# Patient Record
Sex: FEMALE | Race: WHITE | NOT HISPANIC OR LATINO | Employment: STUDENT | URBAN - METROPOLITAN AREA
[De-identification: names, ages, dates, MRNs, and addresses within clinical notes are randomized per-mention and may not be internally consistent; named-entity substitution may affect disease eponyms.]

---
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
## 2024-07-30 ENCOUNTER — EVALUATION (OUTPATIENT)
Dept: OCCUPATIONAL THERAPY | Facility: CLINIC | Age: 29
End: 2024-07-30
Payer: COMMERCIAL

## 2024-07-30 DIAGNOSIS — R29.898 DECREASED GRIP STRENGTH OF RIGHT HAND: ICD-10-CM

## 2024-07-30 DIAGNOSIS — M25.631 DECREASED RANGE OF MOTION OF RIGHT WRIST: Primary | ICD-10-CM

## 2024-07-30 PROCEDURE — 97022 WHIRLPOOL THERAPY: CPT | Mod: GO | Performed by: OCCUPATIONAL THERAPIST

## 2024-07-30 PROCEDURE — 97165 OT EVAL LOW COMPLEX 30 MIN: CPT | Mod: GO | Performed by: OCCUPATIONAL THERAPIST

## 2024-07-30 PROCEDURE — 97110 THERAPEUTIC EXERCISES: CPT | Mod: GO | Performed by: OCCUPATIONAL THERAPIST

## 2024-07-30 ASSESSMENT — PATIENT HEALTH QUESTIONNAIRE - PHQ9
SUM OF ALL RESPONSES TO PHQ9 QUESTIONS 1 AND 2: 0
1. LITTLE INTEREST OR PLEASURE IN DOING THINGS: NOT AT ALL
2. FEELING DOWN, DEPRESSED OR HOPELESS: NOT AT ALL

## 2024-07-30 ASSESSMENT — ENCOUNTER SYMPTOMS
DEPRESSION: 0
LOSS OF SENSATION IN FEET: 0
OCCASIONAL FEELINGS OF UNSTEADINESS: 0

## 2024-07-30 ASSESSMENT — PAIN SCALES - GENERAL: PAINLEVEL_OUTOF10: 0 - NO PAIN

## 2024-07-30 ASSESSMENT — PAIN - FUNCTIONAL ASSESSMENT: PAIN_FUNCTIONAL_ASSESSMENT: 0-10

## 2024-08-01 ENCOUNTER — TREATMENT (OUTPATIENT)
Dept: OCCUPATIONAL THERAPY | Facility: CLINIC | Age: 29
End: 2024-08-01
Payer: COMMERCIAL

## 2024-08-01 DIAGNOSIS — M25.631 DECREASED RANGE OF MOTION OF RIGHT WRIST: ICD-10-CM

## 2024-08-01 DIAGNOSIS — R29.898 DECREASED GRIP STRENGTH OF RIGHT HAND: Primary | ICD-10-CM

## 2024-08-01 PROCEDURE — 97140 MANUAL THERAPY 1/> REGIONS: CPT | Mod: GO | Performed by: OCCUPATIONAL THERAPIST

## 2024-08-01 PROCEDURE — 97110 THERAPEUTIC EXERCISES: CPT | Mod: GO | Performed by: OCCUPATIONAL THERAPIST

## 2024-08-01 PROCEDURE — 97022 WHIRLPOOL THERAPY: CPT | Mod: GO | Performed by: OCCUPATIONAL THERAPIST

## 2024-08-01 NOTE — PROGRESS NOTES
OCCUPATIONAL THERAPY TREATMENT NOTE    Patient Name:  Colin Kay   Patient MRN: [id number]  Date: 8/1/2024  Time Calculation  Start Time: 0700  Stop Time: 0745  Time Calculation (min): 45 min    Insurance:  Visit number: 2   Insurance Type: Payor: [employer] / Plan: [employer] / Product Type: *No Product type* /   Authorization or Plan of Care date Range: 30 PT/OT/ST   Copay: NA  Referred by: No ref. provider found        OT Therapeutic Procedures Time Entry  Manual Therapy Time Entry: 20  Therapeutic Exercise Time Entry: 10  OT Modalities Time Entry  Whirlpool Time Entry: 15                  General:  Reason for visit: Right wrist fracture  Referred by: Wesley Saba PA-C    Type of surgery: No surgery found  Date of surgery: No surgery found  Days since surgery: No surgery found    Assessment:  Progress towards functional goals: Improved mobility in left wrist  Response to interventions:  Patient is limited in passive wrist extension and passive supination with OT stretches and joint mobilizations.  OT added passive table stretches to HEP to increase joint mobility and increase tendon length.  Colin demonstrated good technique.  Justification for continued skilled care: To address remaining functional, objective and subjective deficits to allow them to return to full independence with ADLs. Assess effectiveness of HEP. Modify and progress home exercise program. Skilled intervention required to improve ROM which will improve function. Progressive strengthening to stabilize the right wrist to improve function. Reduce pain to improve function.    Plan:  Progress exercises as tolerated to improve overall UE strength and performance in daily activities , Therapeutic exercises to strengthen left wrist for functional use in daily activities. , Activities to improve fine motor coordination and manual dexterity skills to improve performance in ADLs  "and IADLs, Continued education of techniques such as heat to decrease joint stiffness and muscle tightness., Exercises to gradually increase range of motion., Manual therapy to  muscle tightness and improve joint mobility. , and Modalities as needed to address symptoms.    Therapy diagnoses:   1. Decreased  strength of right hand        2. Decreased range of motion of right wrist  Follow Up In Occupational Therapy           Subjective:  Patient reports she has been trying to keep her brace off at home and when studying.  The wrist extension prayer stretches are difficult and increase pain.     Progress towards functional goal:  Improved mobility in right wrist  Pain Location right wrist  Pain (0-10): 2   HEP adherence / understanding: compliance with the instructed home exercises.    Precautions:  Fall Risk: None    Precautions listed: none    Objective:  Measurements 24    Treatment Performed: (\"NP\" = Not Performed)     Therapeutic Exercise: 10 min  - wrist passive table stretches x4  Therapeutic Activities: NP  -   Manual Therapy: 20 min  - joint traction and stretches to digits, wrist and forearm  Neuromuscular Re-Education: NP  -   Modalities: 15 min  - fluidotherapy with AROM 15 min    Education: Added table stretch wrist exercise(s) to HEP program  "

## 2024-08-05 ENCOUNTER — APPOINTMENT (OUTPATIENT)
Dept: OCCUPATIONAL THERAPY | Facility: CLINIC | Age: 29
End: 2024-08-05
Payer: COMMERCIAL

## 2024-08-06 ENCOUNTER — TREATMENT (OUTPATIENT)
Dept: OCCUPATIONAL THERAPY | Facility: CLINIC | Age: 29
End: 2024-08-06
Payer: COMMERCIAL

## 2024-08-06 DIAGNOSIS — R29.898 DECREASED GRIP STRENGTH OF RIGHT HAND: Primary | ICD-10-CM

## 2024-08-06 DIAGNOSIS — M25.631 DECREASED RANGE OF MOTION OF RIGHT WRIST: ICD-10-CM

## 2024-08-06 PROCEDURE — 97110 THERAPEUTIC EXERCISES: CPT | Mod: GO | Performed by: OCCUPATIONAL THERAPIST

## 2024-08-06 PROCEDURE — 97140 MANUAL THERAPY 1/> REGIONS: CPT | Mod: GO | Performed by: OCCUPATIONAL THERAPIST
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
## 2024-08-08 ENCOUNTER — TREATMENT (OUTPATIENT)
Dept: OCCUPATIONAL THERAPY | Facility: CLINIC | Age: 29
End: 2024-08-08
Payer: COMMERCIAL

## 2024-08-08 DIAGNOSIS — M25.631 DECREASED RANGE OF MOTION OF RIGHT WRIST: ICD-10-CM

## 2024-08-08 DIAGNOSIS — R29.898 DECREASED GRIP STRENGTH OF RIGHT HAND: Primary | ICD-10-CM

## 2024-08-08 PROCEDURE — 97110 THERAPEUTIC EXERCISES: CPT | Mod: GO

## 2024-08-08 PROCEDURE — 97022 WHIRLPOOL THERAPY: CPT | Mod: GO

## 2024-08-08 PROCEDURE — 97140 MANUAL THERAPY 1/> REGIONS: CPT | Mod: GO

## 2024-08-08 ASSESSMENT — PAIN - FUNCTIONAL ASSESSMENT: PAIN_FUNCTIONAL_ASSESSMENT: 0-10

## 2024-08-08 ASSESSMENT — PAIN SCALES - GENERAL: PAINLEVEL_OUTOF10: 0 - NO PAIN

## 2024-08-08 NOTE — PROGRESS NOTES
OCCUPATIONAL THERAPY TREATMENT NOTE    Patient Name:  Colin Kay   Patient MRN: [id number]  Date: 8/8/2024  Time Calculation  Start Time: 1415  Stop Time: 1500  Time Calculation (min): 45 min    Insurance:  Visit number: 4  Insurance Type: Payor: [employer] / Plan: [employer] / Product Type: *No Product type* /   Authorization or Plan of Care date Range: 30 PT/OT/ST   Copay: NA  Referred by: Wesley Saba PA-C       OT Therapeutic Procedures Time Entry  Manual Therapy Time Entry: 20  Therapeutic Exercise Time Entry: 10  OT Modalities Time Entry  Whirlpool Time Entry: 15                  General:  Reason for visit: Right wrist fracture  Referred by: Wesley Saba PA-C    Assessment:  Progress towards functional goals: Improved mobility in left wrist  Response to interventions:  Severe stiffness noted in wrist flexion and supination during manual therapy. Pain noted during wrist flexion. Performed wrist flexion weight hangs and hammer sup/pro to assist in improving ROM. Patient notes mild pain during hammer sup/pro. Advised patient to rest tonight and apply heat if pain/soreness increases. .  Justification for continued skilled care: To address remaining functional, objective and subjective deficits to allow them to return to full independence with ADLs. Assess effectiveness of HEP. Modify and progress home exercise program. Skilled intervention required to improve ROM which will improve function. Progressive strengthening to stabilize the right wrist to improve function. Reduce pain to improve function.    Plan:  Progress exercises as tolerated to improve overall UE strength and performance in daily activities , Therapeutic exercises to strengthen left wrist for functional use in daily activities. , Activities to improve fine motor coordination and manual dexterity skills to improve performance in ADLs and IADLs, Continued education of  "techniques such as heat to decrease joint stiffness and muscle tightness., Exercises to gradually increase range of motion., Manual therapy to  muscle tightness and improve joint mobility. , and Modalities as needed to address symptoms.    Therapy diagnoses:   1. Decreased  strength of right hand        2. Decreased range of motion of right wrist  Follow Up In Occupational Therapy             Subjective:  Patient reports she continues to have stiffness in the R wrist. Notes she has stopped wearing the wrist wrap provided. Has been completing her HEP exercises but notes a moderate pain when completing them.      Progress towards functional goal:  Improved mobility in right wrist  Pain Location right wrist  Pain (0-10): 0; described as stiffness  HEP adherence / understanding: compliance with the instructed home exercises.    Precautions:  Fall Risk: None    Precautions listed: none    Objective:  Measurements 24    Treatment Performed: (\"NP\" = Not Performed)     Therapeutic Exercise: 10 min  - Wrist flexion weight hangs with 1#   - Hammer supination and pronation   Therapeutic Activities: NP  -   Manual Therapy: 20 min  - joint traction and stretches to digits, wrist and forearm  Neuromuscular Re-Education: NP  -   Modalities: 15 min  - fluidotherapy with AROM 15 min    Education: Reviewed home exercise program.  "

## 2024-08-09 ENCOUNTER — LAB REQUISITION (OUTPATIENT)
Dept: LAB | Facility: HOSPITAL | Age: 29
End: 2024-08-09
Payer: COMMERCIAL

## 2024-08-09 ENCOUNTER — APPOINTMENT (OUTPATIENT)
Dept: OCCUPATIONAL THERAPY | Facility: CLINIC | Age: 29
End: 2024-08-09
Payer: COMMERCIAL

## 2024-08-09 PROCEDURE — 87086 URINE CULTURE/COLONY COUNT: CPT

## 2024-08-09 NOTE — PROGRESS NOTES
z                                                             OCCUPATIONAL THERAPY TREATMENT NOTE    Patient Name:  Colin Kay   Patient MRN: [id number]  Date: 8/12/2024  Time Calculation  Start Time: 0215  Stop Time: 0300  Time Calculation (min): 45 min    Insurance:  Visit number: 5  Insurance Type: Payor: [employer] / Plan: [employer] HE[employer] PLAN / Product Type: *No Product type* /   Authorization or Plan of Care date Range: 30 PT/OT/ST   Copay: NA  Referred by: Wesley Saba PA-C       OT Therapeutic Procedures Time Entry  Manual Therapy Time Entry: 24  Therapeutic Activity Time Entry: 6  OT Modalities Time Entry  Whirlpool Time Entry: 15                  General:  Reason for visit: Right wrist fracture  Referred by: Wesley Saba PA-C    Assessment:  Progress towards functional goals: Improved mobility in left wrist  Response to interventions:  OT initiated BTE program for no resistance stretching of wrist and forearm this visit.  Patient was challenged with decreased range, strength and endurance demonstrated.  Continued focus on supination for HEP.  Justification for continued skilled care: To address remaining functional, objective and subjective deficits to allow them to return to full independence with ADLs. Assess effectiveness of HEP. Modify and progress home exercise program. Skilled intervention required to improve ROM which will improve function. Progressive strengthening to stabilize the right wrist to improve function. Reduce pain to improve function.    Plan:  Progress exercises as tolerated to improve overall UE strength and performance in daily activities , Therapeutic exercises to strengthen left wrist for functional use in daily activities. , Activities to improve fine motor coordination and manual dexterity skills to improve performance in ADLs and IADLs, Continued education of techniques such as heat to decrease joint stiffness and muscle tightness., Exercises to gradually increase range of  "motion., Manual therapy to  muscle tightness and improve joint mobility. , and Modalities as needed to address symptoms.    Therapy diagnoses:   1. Decreased  strength of right hand        2. Decreased range of motion of right wrist  Follow Up In Occupational Therapy               Subjective:  Patient reports the only time she is wearing the wrist brace is to sleep.  She notes slow improvements in range of motion for daily living tasks.      Progress towards functional goal:  Improved mobility in right wrist  Pain Location right wrist  Pain (0-10): 0; described as stiffness  HEP adherence / understanding: compliance with the instructed home exercises.    Precautions:  Fall Risk: None    Precautions listed: none    Objective:  Measurements 24    Treatment Performed: (\"NP\" = Not Performed)     Therapeutic Exercise: NP    Therapeutic Activities: 4 min  -  1 min  -  2 min  -  2 min  Manual Therapy: 26 min  - joint traction and stretches to digits, wrist and forearm  Neuromuscular Re-Education: NP  -   Modalities: 15 min  - fluidotherapy with AROM 15 min    Education: Reviewed home exercise program.  "

## 2024-08-11 LAB — BACTERIA UR CULT: NORMAL

## 2024-08-12 ENCOUNTER — TREATMENT (OUTPATIENT)
Dept: OCCUPATIONAL THERAPY | Facility: CLINIC | Age: 29
End: 2024-08-12
Payer: COMMERCIAL

## 2024-08-12 DIAGNOSIS — R29.898 DECREASED GRIP STRENGTH OF RIGHT HAND: Primary | ICD-10-CM

## 2024-08-12 DIAGNOSIS — M25.631 DECREASED RANGE OF MOTION OF RIGHT WRIST: ICD-10-CM

## 2024-08-12 PROCEDURE — 97022 WHIRLPOOL THERAPY: CPT | Mod: GO | Performed by: OCCUPATIONAL THERAPIST

## 2024-08-12 PROCEDURE — 97140 MANUAL THERAPY 1/> REGIONS: CPT | Mod: GO | Performed by: OCCUPATIONAL THERAPIST

## 2024-08-13 NOTE — PROGRESS NOTES
OCCUPATIONAL THERAPY TREATMENT NOTE    Patient Name:  Colin Kay   Patient MRN: [id number]  Date: 8/14/2024  Time Calculation  Start Time: 0215  Stop Time: 0300  Time Calculation (min): 45 min    Insurance:  Visit number: 6  Insurance Type: Payor: [employer] / Plan: [employer] / Product Type: *No Product type* /   Authorization or Plan of Care date Range: 30 PT/OT/ST   Copay: NA  Referred by: Wesley Saba PA-C       OT Therapeutic Procedures Time Entry  Manual Therapy Time Entry: 20  Therapeutic Exercise Time Entry: 10  OT Modalities Time Entry  Whirlpool Time Entry: 15                  General:  Reason for visit: Right wrist fracture  Referred by: Wesley Saba PA-C    Assessment:  Progress towards functional goals: Improved mobility in left wrist  Response to interventions:  Increased active range of motion in all planes since last measurement.  Patient is concerned with lack of strength in hand and with slow progress in range of motion.  OT updated HEP to include putty strengthening program for  and pinch.  Patient demonstrated good technique with each exercise.  OT educated patient on benefits of [employer]ne dynamic brace for both supination and wrist extension.  OT will contact sales rep for insurance verification.  Justification for continued skilled care: To address remaining functional, objective and subjective deficits to allow them to return to full independence with ADLs. Assess effectiveness of HEP. Modify and progress home exercise program. Skilled intervention required to improve ROM which will improve function. Progressive strengthening to stabilize the right wrist to improve function. Reduce pain to improve function.    Plan:  Progress exercises as tolerated to improve overall UE strength and performance in daily activities , Therapeutic exercises to strengthen left wrist for functional use in daily activities. , Activities to improve fine motor coordination  "and manual dexterity skills to improve performance in ADLs and IADLs, Continued education of techniques such as heat to decrease joint stiffness and muscle tightness., Exercises to gradually increase range of motion., Manual therapy to  muscle tightness and improve joint mobility. , and Modalities as needed to address symptoms.  Request insurance verification from [employer]    Therapy diagnoses:   1. Decreased  strength of right hand        2. Decreased range of motion of right wrist  Follow Up In Occupational Therapy            Subjective:  Patient reports she was very sore after last visit and wore her brace to sleep.  Pain has resolved today.      Progress towards functional goal:  Improved mobility in right wrist  Pain Location right wrist  Pain (0-10): 0; described as stiffness  HEP adherence / understanding: compliance with the instructed home exercises.    Precautions:  Fall Risk: None    Precautions listed: none    Objective:  Measurements 24  right Elbow:  - Flexion: WNL  - Extension: WNL  - Supination: 44  - Pronation: 75     Right Wrist:  - Flexion: 35  - Extension: 52  - Rd dev: 15  - Ul dev: 20  Treatment Performed: (\"NP\" = Not Performed)     Therapeutic Exercise: 10 min  Blue putty HEP:  , roll and tip pinch, lateral pinch, 3pt pinch  Written and illustrated handouts on benefits of stat a dyne brace   Therapeutic Activities: NP    Manual Therapy: 20 min  - joint traction and stretches to digits, wrist and forearm  Neuromuscular Re-Education: NP  -   Modalities: 15 min  - fluidotherapy with AROM 15 min    Education: Added putty exercise(s) to HEP program and educated on stat a dyne dynamic splint  "

## 2024-08-14 ENCOUNTER — TREATMENT (OUTPATIENT)
Dept: OCCUPATIONAL THERAPY | Facility: CLINIC | Age: 29
End: 2024-08-14
Payer: COMMERCIAL

## 2024-08-14 DIAGNOSIS — M25.631 DECREASED RANGE OF MOTION OF RIGHT WRIST: ICD-10-CM

## 2024-08-14 DIAGNOSIS — R29.898 DECREASED GRIP STRENGTH OF RIGHT HAND: Primary | ICD-10-CM

## 2024-08-14 PROCEDURE — 97110 THERAPEUTIC EXERCISES: CPT | Mod: GO | Performed by: OCCUPATIONAL THERAPIST

## 2024-08-14 PROCEDURE — 97022 WHIRLPOOL THERAPY: CPT | Mod: GO | Performed by: OCCUPATIONAL THERAPIST

## 2024-08-14 PROCEDURE — 97140 MANUAL THERAPY 1/> REGIONS: CPT | Mod: GO | Performed by: OCCUPATIONAL THERAPIST

## 2024-08-19 NOTE — PROGRESS NOTES
OCCUPATIONAL THERAPY TREATMENT NOTE    Patient Name:  Colin Kay   Patient MRN: [id number]  Date: 2024  Time Calculation  Start Time: 08  Stop Time: 920  Time Calculation (min): 45 min    Insurance:  Visit number: 7  Insurance Type: Payor: [employer] / Plan: [employer] / Product Type: *No Product type* /   Authorization or Plan of Care date Range: 30 PT/OT/ST   Copay: NA  Referred by: Wesley Saba PA-C       OT Therapeutic Procedures Time Entry  Manual Therapy Time Entry: 30  OT Modalities Time Entry  Whirlpool Time Entry: 15                  General:  Reason for visit: Right wrist fracture  Referred by: Wesley Saba PA-C    Assessment:  Progress towards functional goals: Improved mobility in left wrist  Response to interventions:  Increased tolerance to OT stretches in wrist flexion and extension this visit.  Improving wrist extension with stretch in the wrist flexors.  Continued limitation in active and passive supination with DRUJ tightness.  Justification for continued skilled care: To address remaining functional, objective and subjective deficits to allow them to return to full independence with ADLs. Assess effectiveness of HEP. Modify and progress home exercise program. Skilled intervention required to improve ROM which will improve function. Progressive strengthening to stabilize the right wrist to improve function. Reduce pain to improve function.    Plan:  Progress exercises as tolerated to improve overall UE strength and performance in daily activities , Therapeutic exercises to strengthen left wrist for functional use in daily activities. , Activities to improve fine motor coordination and manual dexterity skills to improve performance in ADLs and IADLs, Continued education of techniques such as heat to decrease joint stiffness and muscle tightness., Exercises to gradually increase range of motion., Manual therapy to  muscle tightness and improve  "joint mobility. , and Modalities as needed to address symptoms.  Request insurance verification from [employer] a [employer]  Awaiting signed script from physician for [employer] approval    Therapy diagnoses:   1. Decreased  strength of right hand        2. Decreased range of motion of right wrist  Follow Up In Occupational Therapy              Subjective:  Patient reports she is experiencing more \"cracking\" in her wrist over the past several days.      Progress towards functional goal:  Improved mobility in right wrist  Pain Location right wrist  Pain (0-10): 0; described as stiffness  HEP adherence / understanding: compliance with the instructed home exercises.    Precautions:  Fall Risk: None    Precautions listed: none    Objective:  Measurements 7/30/24, 8/14/24    Treatment Performed: (\"NP\" = Not Performed)     Therapeutic Exercise: NP    Therapeutic Activities: NP    Manual Therapy: 30 min  - joint traction and stretches to digits, wrist and forearm  Neuromuscular Re-Education: NP  -   Modalities: 15 min  - fluidotherapy with AROM 15 min    Education: Reviewed home exercise program.   "

## 2024-08-20 ENCOUNTER — TREATMENT (OUTPATIENT)
Dept: OCCUPATIONAL THERAPY | Facility: CLINIC | Age: 29
End: 2024-08-20
Payer: COMMERCIAL

## 2024-08-20 DIAGNOSIS — M25.631 DECREASED RANGE OF MOTION OF RIGHT WRIST: ICD-10-CM

## 2024-08-20 DIAGNOSIS — R29.898 DECREASED GRIP STRENGTH OF RIGHT HAND: Primary | ICD-10-CM

## 2024-08-20 PROCEDURE — 97022 WHIRLPOOL THERAPY: CPT | Mod: GO | Performed by: OCCUPATIONAL THERAPIST

## 2024-08-20 PROCEDURE — 97140 MANUAL THERAPY 1/> REGIONS: CPT | Mod: GO | Performed by: OCCUPATIONAL THERAPIST

## 2024-08-21 NOTE — PROGRESS NOTES
OCCUPATIONAL THERAPY TREATMENT NOTE    Patient Name:  Colin Kay   Patient MRN: [id number]  Date: 8/22/2024  Time Calculation  Start Time: 0700  Stop Time: 0745  Time Calculation (min): 45 min    Insurance:  Visit number: 8  Insurance Type: Payor: [employer] / Plan: [employer] / Product Type: *No Product type* /   Authorization or Plan of Care date Range: 30 PT/OT/ST   Copay: NA  Referred by: Wesley Saba PA-C       OT Therapeutic Procedures Time Entry  Manual Therapy Time Entry: 30  OT Modalities Time Entry  Whirlpool Time Entry: 15                  General:  Reason for visit: Right wrist fracture  Referred by: Wesley Saba PA-C    Assessment:  Progress towards functional goals: Improved mobility in left wrist  Response to interventions:  Increased DRUJ mobility with OT manual techniques this visit. Improving tolerance to passive stretches with decreased pain levels.  Patient would benefit from stat a dyne dynamic splint to increase wrist and forearm AROM and to improve joint mobility to improve functional use of the right arm for ADL and work/school tasks.  Justification for continued skilled care: To address remaining functional, objective and subjective deficits to allow them to return to full independence with ADLs. Assess effectiveness of HEP. Modify and progress home exercise program. Skilled intervention required to improve ROM which will improve function. Progressive strengthening to stabilize the right wrist to improve function. Reduce pain to improve function.    Plan:  Progress exercises as tolerated to improve overall UE strength and performance in daily activities , Therapeutic exercises to strengthen left wrist for functional use in daily activities. , Activities to improve fine motor coordination and manual dexterity skills to improve performance in ADLs and IADLs, Continued education of techniques such as heat to decrease joint stiffness and muscle tightness.,  "Exercises to gradually increase range of motion., Manual therapy to  muscle tightness and improve joint mobility. , and Modalities as needed to address symptoms.  Faxed signed script and measurements to stat a dyne rep for ordering    Therapy diagnoses:   1. Decreased  strength of right hand  Follow Up In Occupational Therapy      2. Decreased range of motion of right wrist  Follow Up In Occupational Therapy                Subjective:  Signed script received and OT will proceed with stat a dyne order.  Patient reports \"cracking\" has increased and slight improvement in mobility.     Progress towards functional goal:  Improved mobility in right wrist  Pain Location right wrist  Pain (0-10): 0; described as stiffness  HEP adherence / understanding: compliance with the instructed home exercises.    Precautions:  Fall Risk: None    Precautions listed: none    Objective:  Measurements 24, 24    Treatment Performed: (\"NP\" = Not Performed)     Therapeutic Exercise: NP    Therapeutic Activities: NP    Manual Therapy: 30 min  - joint traction and stretches to digits, wrist and forearm  Neuromuscular Re-Education: NP  -   Modalities: 15 min  - fluidotherapy with AROM 15 min    Education: Reviewed home exercise program.   "

## 2024-08-22 ENCOUNTER — TREATMENT (OUTPATIENT)
Dept: OCCUPATIONAL THERAPY | Facility: CLINIC | Age: 29
End: 2024-08-22
Payer: COMMERCIAL

## 2024-08-22 DIAGNOSIS — M25.631 DECREASED RANGE OF MOTION OF RIGHT WRIST: ICD-10-CM

## 2024-08-22 DIAGNOSIS — R29.898 DECREASED GRIP STRENGTH OF RIGHT HAND: Primary | ICD-10-CM

## 2024-08-22 PROCEDURE — 97022 WHIRLPOOL THERAPY: CPT | Mod: GO | Performed by: OCCUPATIONAL THERAPIST

## 2024-08-22 PROCEDURE — 97140 MANUAL THERAPY 1/> REGIONS: CPT | Mod: GO | Performed by: OCCUPATIONAL THERAPIST

## 2024-08-26 NOTE — PROGRESS NOTES
OCCUPATIONAL THERAPY TREATMENT NOTE    Patient Name:  Colin Kay   Patient MRN: [id number]  Date: 8/27/2024

## 2024-08-27 ENCOUNTER — DOCUMENTATION (OUTPATIENT)
Dept: OCCUPATIONAL THERAPY | Facility: CLINIC | Age: 29
End: 2024-08-27
Payer: COMMERCIAL

## 2024-08-27 ENCOUNTER — APPOINTMENT (OUTPATIENT)
Dept: OCCUPATIONAL THERAPY | Facility: CLINIC | Age: 29
End: 2024-08-27
Payer: COMMERCIAL

## 2024-08-27 NOTE — PROGRESS NOTES
Occupational Therapy                 Therapy Communication Note    Patient Name: Colin Kay  MRN: [id number]  Today's Date: 8/27/2024     Discipline: Occupational Therapy    Missed Visit Reason: Patient cancelled today's appt, no reason     Missed Time: Cancel    Comment:

## 2024-08-29 ENCOUNTER — TREATMENT (OUTPATIENT)
Dept: OCCUPATIONAL THERAPY | Facility: CLINIC | Age: 29
End: 2024-08-29
Payer: COMMERCIAL

## 2024-08-29 DIAGNOSIS — R29.898 DECREASED GRIP STRENGTH OF RIGHT HAND: Primary | ICD-10-CM

## 2024-08-29 DIAGNOSIS — M25.631 DECREASED RANGE OF MOTION OF RIGHT WRIST: ICD-10-CM

## 2024-08-29 PROCEDURE — 97140 MANUAL THERAPY 1/> REGIONS: CPT | Mod: GO | Performed by: OCCUPATIONAL THERAPIST

## 2024-08-29 PROCEDURE — 97022 WHIRLPOOL THERAPY: CPT | Mod: GO | Performed by: OCCUPATIONAL THERAPIST

## 2024-08-29 PROCEDURE — 97110 THERAPEUTIC EXERCISES: CPT | Mod: GO | Performed by: OCCUPATIONAL THERAPIST

## 2024-08-29 NOTE — PROGRESS NOTES
OCCUPATIONAL THERAPY TREATMENT NOTE    Patient Name:  Colin Kay   Patient MRN: [id number]  Date: 2024  Time Calculation  Start Time: 0430  Stop Time: 0520  Time Calculation (min): 50 min    Insurance:  Visit number: 9  Insurance Type: Payor: [employer] / Plan: [employer] / Product Type: *No Product type* /   Authorization or Plan of Care date Range: 30 PT/OT/ST   Copay: NA  Referred by: Wesley Saba PA-C       OT Therapeutic Procedures Time Entry  Manual Therapy Time Entry: 30  Therapeutic Exercise Time Entry: 10  OT Modalities Time Entry  Whirlpool Time Entry: 15                  General:  Reason for visit: Right wrist fracture  Referred by: Wesley Saba PA-C    Assessment:  Progress towards functional goals: Improved mobility in left wrist  Response to interventions:  Increased AROM in all planes except supination.  OT added wrist extension wall pushups to HEP and patient demonstrated good technique.    Justification for continued skilled care: To address remaining functional, objective and subjective deficits to allow them to return to full independence with ADLs. Assess effectiveness of HEP. Modify and progress home exercise program. Skilled intervention required to improve ROM which will improve function. Progressive strengthening to stabilize the right wrist to improve function. Reduce pain to improve function.    Plan:  Progress exercises as tolerated to improve overall UE strength and performance in daily activities , Therapeutic exercises to strengthen left wrist for functional use in daily activities. , Activities to improve fine motor coordination and manual dexterity skills to improve performance in ADLs and IADLs, Continued education of techniques such as heat to decrease joint stiffness and muscle tightness., Exercises to gradually increase range of motion., Manual therapy to  muscle tightness and improve joint mobility. , and Modalities as needed to  "address symptoms.    Therapy diagnoses:   1. Decreased  strength of right hand        2. Decreased range of motion of right wrist  Follow Up In Occupational Therapy                  Subjective:  Patient reports it has been a busy week at school with exams and she has not been as diligent in completing HEP.     Progress towards functional goal:  Improved mobility in right wrist  Pain Location right wrist  Pain (0-10): 1; described as stiffness  HEP adherence / understanding: compliance with the instructed home exercises.    Precautions:  Fall Risk: None    Precautions listed: none    Objective:  Measurements 7/30/24, 8/14/24, 8/29/24  right Elbow:  - Flexion: WNL  - Extension: WNL  - Supination: 44  - Pronation: 85     Right Wrist:  - Flexion: 45  - Extension: 66  - Rd dev: 15  - Ul dev: 25  Treatment Performed: (\"NP\" = Not Performed)     Therapeutic Exercise:10 min  -objective measures of AROM  - wall pushups for HEP to increase wrist extension  Therapeutic Activities: NP    Manual Therapy: 30 min  - joint traction and stretches to digits, wrist and forearm  Neuromuscular Re-Education: NP  -   Modalities: 15 min  - fluidotherapy with AROM 15 min    Education: Reviewed home exercise program.   "

## 2024-09-04 ENCOUNTER — TREATMENT (OUTPATIENT)
Dept: OCCUPATIONAL THERAPY | Facility: CLINIC | Age: 29
End: 2024-09-04
Payer: COMMERCIAL

## 2024-09-04 DIAGNOSIS — M25.631 DECREASED RANGE OF MOTION OF RIGHT WRIST: Primary | ICD-10-CM

## 2024-09-04 DIAGNOSIS — R29.898 DECREASED GRIP STRENGTH OF RIGHT HAND: ICD-10-CM

## 2024-09-04 PROCEDURE — 97022 WHIRLPOOL THERAPY: CPT | Mod: GO | Performed by: OCCUPATIONAL THERAPIST

## 2024-09-04 PROCEDURE — 97140 MANUAL THERAPY 1/> REGIONS: CPT | Mod: GO | Performed by: OCCUPATIONAL THERAPIST

## 2024-09-04 NOTE — PROGRESS NOTES
OCCUPATIONAL THERAPY TREATMENT NOTE    Patient Name:  Colin Kay   Patient MRN: [id number]  Date: 2024  Time Calculation  Start Time: 345  Stop Time: 0430  Time Calculation (min): 45 min    Insurance:  Visit number: 10  Insurance Type: Payor: [employer] / Plan: [employer] / Product Type: *No Product type* /   Authorization or Plan of Care date Range: 30 PT/OT/ST   Copay: NA  Referred by: Wesley Saba PA-C       OT Therapeutic Procedures Time Entry  Manual Therapy Time Entry: 25  Therapeutic Exercise Time Entry: 5  OT Modalities Time Entry  Whirlpool Time Entry: 15                  General:  Reason for visit: Right wrist fracture  Referred by: Wesley Saba PA-C    Assessment:  Progress towards functional goals: Improved mobility in left wrist  Response to interventions:  Increased active supination was demonstrated by 5 degrees since last visit. OT added dowel passive supination stretch to HEP and patient demonstrated good technique and good stretch was achieved.    Justification for continued skilled care: To address remaining functional, objective and subjective deficits to allow them to return to full independence with ADLs. Assess effectiveness of HEP. Modify and progress home exercise program. Skilled intervention required to improve ROM which will improve function. Progressive strengthening to stabilize the right wrist to improve function. Reduce pain to improve function.    Plan:  Progress exercises as tolerated to improve overall UE strength and performance in daily activities , Therapeutic exercises to strengthen left wrist for functional use in daily activities. , Activities to improve fine motor coordination and manual dexterity skills to improve performance in ADLs and IADLs, Continued education of techniques such as heat to decrease joint stiffness and muscle tightness., Exercises to gradually increase range of motion., Manual therapy to  muscle  "tightness and improve joint mobility. , and Modalities as needed to address symptoms.  OT checking on Dynasplint coverage for supination splint    Therapy diagnoses:   1. Decreased range of motion of right wrist  Follow Up In Occupational Therapy      2. 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  The stat a dyne brace is out of network and would be $180/month out of pocket .     Progress towards functional goal:  Improved mobility in right wrist  Pain Location right wrist  Pain (0-10): 1; described as stiffness  HEP adherence / understanding: compliance with the instructed home exercises.    Precautions:  Fall Risk: None    Precautions listed: none    Objective:  Measurements 7/30/24, 8/14/24, 8/29/24  Supination 50 deg    Treatment Performed: (\"NP\" = Not Performed)     Therapeutic Exercise:5 min  Dowel supination stretch  Therapeutic Activities: NP    Manual Therapy: 25 min  - joint traction and stretches to digits, wrist and forearm  Neuromuscular Re-Education: NP  -   Modalities: 15 min  - fluidotherapy with AROM 15 min    Education: Added dowel supination exercise(s) to HEP program   "

## 2024-09-05 ENCOUNTER — TREATMENT (OUTPATIENT)
Dept: OCCUPATIONAL THERAPY | Facility: CLINIC | Age: 29
End: 2024-09-05
Payer: COMMERCIAL

## 2024-09-05 DIAGNOSIS — M25.631 DECREASED RANGE OF MOTION OF RIGHT WRIST: ICD-10-CM

## 2024-09-05 DIAGNOSIS — R29.898 DECREASED GRIP STRENGTH OF RIGHT HAND: Primary | ICD-10-CM

## 2024-09-05 PROCEDURE — 97022 WHIRLPOOL THERAPY: CPT | Mod: GO | Performed by: OCCUPATIONAL THERAPIST

## 2024-09-05 PROCEDURE — 97140 MANUAL THERAPY 1/> REGIONS: CPT | Mod: GO | Performed by: OCCUPATIONAL THERAPIST

## 2024-09-05 NOTE — PROGRESS NOTES
OCCUPATIONAL THERAPY TREATMENT NOTE    Patient Name:  Colin Kay   Patient MRN: [id number]  Date: 2024  Time Calculation  Start Time: 1350  Stop Time: 1430  Time Calculation (min): 40 min    Insurance:  Visit number: 11  Insurance Type: Payor: [employer] / Plan: [employer] / Product Type: *No Product type* /   Authorization or Plan of Care date Range: 30 PT/OT/ST   Copay: NA  Referred by: Wesley Saba PA-C       OT Therapeutic Procedures Time Entry  Manual Therapy Time Entry: 25  OT Modalities Time Entry  Whirlpool Time Entry: 15                  General:  Reason for visit: Right wrist fracture  Referred by: Wesley Saba PA-C    Assessment:  Progress towards functional goals: Improved mobility in left wrist  Response to interventions:  Increased joint mobility and crepitus with aggressive OT manual techniques this visit.  She achieved and increase of 4 degrees in supination actively from yesterday's visit.     Justification for continued skilled care: To address remaining functional, objective and subjective deficits to allow them to return to full independence with ADLs. Assess effectiveness of HEP. Modify and progress home exercise program. Skilled intervention required to improve ROM which will improve function. Progressive strengthening to stabilize the right wrist to improve function. Reduce pain to improve function.    Plan:  Progress exercises as tolerated to improve overall UE strength and performance in daily activities , Therapeutic exercises to strengthen left wrist for functional use in daily activities. , Activities to improve fine motor coordination and manual dexterity skills to improve performance in ADLs and IADLs, Continued education of techniques such as heat to decrease joint stiffness and muscle tightness., Exercises to gradually increase range of motion., Manual therapy to  muscle tightness and improve joint mobility. , and Modalities as needed  "to address symptoms.  OT checking on Dynasplint coverage for supination splint    Therapy diagnoses:   1. Decreased  strength of right hand        2. Decreased range of motion of right wrist  Follow Up In Occupational Therapy            Subjective:  Patient reports she was unable to attempt supination stretches as she was just here yesterday and has been studying for exams.     Progress towards functional goal:  Improved mobility in right wrist  Pain Location right wrist  Pain (0-10): 1; described as stiffness  HEP adherence / understanding: compliance with the instructed home exercises.    Precautions:  Fall Risk: None    Precautions listed: none    Objective:  Measurements 7/30/24, 8/14/24, 8/29/24  Supination 54 deg  Flexion 45  Extension 65    Treatment Performed: (\"NP\" = Not Performed)     Therapeutic Exercise:NP    Therapeutic Activities: NP    Manual Therapy: 25 min  - joint traction and stretches to digits, wrist and forearm  Neuromuscular Re-Education: NP  -   Modalities: 15 min  - fluidotherapy with AROM 15 min    Education: Reviewed home exercise program.   "

## 2024-09-09 NOTE — PROGRESS NOTES
OCCUPATIONAL THERAPY TREATMENT NOTE    Patient Name:  Colin Kay   Patient MRN: [id number]  Date: 9/10/2024  Time Calculation  Start Time: 0745  Stop Time: 0830  Time Calculation (min): 45 min    Insurance:  Visit number: 12  Insurance Type: Payor: [employer]IDEN / Plan: [employer] / Product Type: *No Product type* /   Authorization or Plan of Care date Range: 30 PT/OT/ST   Copay: NA  Referred by: Wesley Saba PA-C       OT Therapeutic Procedures Time Entry  Manual Therapy Time Entry: 25  Therapeutic Exercise Time Entry: 2  OT Modalities Time Entry  Whirlpool Time Entry: 15                  General:  Reason for visit: Right wrist fracture  Referred by: Wesley Saba PA-C    Assessment:  Progress towards functional goals: Improved mobility in left wrist  Response to interventions:  Colin is making slow and steady progress in AROM of wrist and forearm in all planes.  Improved DRUJ mobility with OT manual techniques this visit.  OT instructed patient to return to wrist extension stretches along with continued focus on supination stretches.     Justification for continued skilled care: To address remaining functional, objective and subjective deficits to allow them to return to full independence with ADLs. Assess effectiveness of HEP. Modify and progress home exercise program. Skilled intervention required to improve ROM which will improve function. Progressive strengthening to stabilize the right wrist to improve function. Reduce pain to improve function.    Plan:  Progress exercises as tolerated to improve overall UE strength and performance in daily activities , Therapeutic exercises to strengthen left wrist for functional use in daily activities. , Activities to improve fine motor coordination and manual dexterity skills to improve performance in ADLs and IADLs, Continued education of techniques such as heat to decrease joint stiffness and muscle tightness., Exercises to  "gradually increase range of motion., Manual therapy to  muscle tightness and improve joint mobility. , and Modalities as needed to address symptoms.  OT checking on Dynasplint coverage for supination splint    Therapy diagnoses:   1. Decreased range of motion of right wrist        2. Decreased  strength of right hand  Follow Up In Occupational Therapy          Subjective:  Patient reports she has been hearing a lot of \"cracking\" in her wrist joint since last visit.     Progress towards functional goal:  Improved mobility in right wrist  Pain Location right wrist  Pain (0-10): 1; described as stiffness  HEP adherence / understanding: compliance with the instructed home exercises.    Precautions:  Fall Risk: None    Precautions listed: none    Objective:  Measurements 24, 24, 24, 9/10/24  Supination 55 deg  Pronation 85  Wrist flexion 55  Wrist ext 62    Treatment Performed: (\"NP\" = Not Performed)     Therapeutic Exercise: 2 min   1T 2 min stretching  Therapeutic Activities: NP    Manual Therapy: 25 min  - joint traction and stretches to digits, wrist and forearm  Neuromuscular Re-Education: NP  -   Modalities: 15 min  - fluidotherapy with AROM 15 min    Education: Reviewed home exercise program.   "

## 2024-09-10 ENCOUNTER — TREATMENT (OUTPATIENT)
Dept: OCCUPATIONAL THERAPY | Facility: CLINIC | Age: 29
End: 2024-09-10
Payer: COMMERCIAL

## 2024-09-10 DIAGNOSIS — M25.631 DECREASED RANGE OF MOTION OF RIGHT WRIST: Primary | ICD-10-CM

## 2024-09-10 DIAGNOSIS — R29.898 DECREASED GRIP STRENGTH OF RIGHT HAND: ICD-10-CM

## 2024-09-10 PROCEDURE — 97022 WHIRLPOOL THERAPY: CPT | Mod: GO | Performed by: OCCUPATIONAL THERAPIST

## 2024-09-10 PROCEDURE — 97140 MANUAL THERAPY 1/> REGIONS: CPT | Mod: GO | Performed by: OCCUPATIONAL THERAPIST

## 2024-09-11 NOTE — PROGRESS NOTES
OCCUPATIONAL THERAPY TREATMENT NOTE    Patient Name:  Colin Kay   Patient MRN: [id number]  Date: 2024  Time Calculation  Start Time: 0200  Stop Time: 0245  Time Calculation (min): 45 min    Insurance:  Visit number: 13  Insurance Type: Payor: [employer] / Plan: [employer] / Product Type: *No Product type* /   Authorization or Plan of Care date Range: 30 PT/OT/ST   Copay: NA  Referred by: Wesley Saba PA-C       OT Therapeutic Procedures Time Entry  Manual Therapy Time Entry: 20  Therapeutic Exercise Time Entry: 10  OT Modalities Time Entry  Whirlpool Time Entry: 15                  General:  Reason for visit: Right wrist fracture  Referred by: Wesley Saba PA-C    Assessment:  Progress towards functional goals: Improved mobility in left wrist  Response to interventions:  Colin was able to complete BTE program today with improving active range of motion in all wrist and forearm planes.  Increased active supination by 5 degrees since last week.   Justification for continued skilled care: To address remaining functional, objective and subjective deficits to allow them to return to full independence with ADLs. Assess effectiveness of HEP. Modify and progress home exercise program. Skilled intervention required to improve ROM which will improve function. Progressive strengthening to stabilize the right wrist to improve function. Reduce pain to improve function.    Plan:  Progress exercises as tolerated to improve overall UE strength and performance in daily activities , Therapeutic exercises to strengthen left wrist for functional use in daily activities. , Activities to improve fine motor coordination and manual dexterity skills to improve performance in ADLs and IADLs, Continued education of techniques such as heat to decrease joint stiffness and muscle tightness., Exercises to gradually increase range of motion., Manual therapy to  muscle  "tightness and improve joint mobility. , and Modalities as needed to address symptoms.  OT checking on Dynasplint coverage for supination splint    Therapy diagnoses:   1. Decreased range of motion of right wrist        2. Decreased  strength of right hand  Follow Up In Occupational Therapy            Subjective:  Patient reports no complaints of pain today.  She has been experiencing more \"cracking\" in her wrist with use.     Progress towards functional goal:  Improved mobility in right wrist  Pain Location right wrist  Pain (0-10): 0; described as stiffness  HEP adherence / understanding: compliance with the instructed home exercises.    Precautions:  Fall Risk: None    Precautions listed: none    Objective:  Measurements 7/30/24, 8/14/24, 8/29/24, 9/10/24  Supination 60  deg    Treatment Performed: (\"NP\" = Not Performed)     Therapeutic Exercise: 10 min   1T 2 min stretching   2 min flex/ext   2 min jar lid   2 min steering wheel  Therapeutic Activities: NP    Manual Therapy: 20 min  - joint traction and stretches to digits, wrist and forearm  Neuromuscular Re-Education: NP  -   Modalities: 15 min  - fluidotherapy with AROM 15 min    Education: Reviewed home exercise program.   "

## 2024-09-12 ENCOUNTER — TREATMENT (OUTPATIENT)
Dept: OCCUPATIONAL THERAPY | Facility: CLINIC | Age: 29
End: 2024-09-12
Payer: COMMERCIAL

## 2024-09-12 DIAGNOSIS — R29.898 DECREASED GRIP STRENGTH OF RIGHT HAND: ICD-10-CM

## 2024-09-12 DIAGNOSIS — M25.631 DECREASED RANGE OF MOTION OF RIGHT WRIST: Primary | ICD-10-CM

## 2024-09-12 PROCEDURE — 97022 WHIRLPOOL THERAPY: CPT | Mod: GO | Performed by: OCCUPATIONAL THERAPIST

## 2024-09-12 PROCEDURE — 97140 MANUAL THERAPY 1/> REGIONS: CPT | Mod: GO | Performed by: OCCUPATIONAL THERAPIST

## 2024-09-12 PROCEDURE — 97110 THERAPEUTIC EXERCISES: CPT | Mod: GO | Performed by: OCCUPATIONAL THERAPIST

## 2024-09-18 ENCOUNTER — TREATMENT (OUTPATIENT)
Dept: OCCUPATIONAL THERAPY | Facility: CLINIC | Age: 29
End: 2024-09-18
Payer: COMMERCIAL

## 2024-09-18 DIAGNOSIS — M25.631 DECREASED RANGE OF MOTION OF RIGHT WRIST: Primary | ICD-10-CM

## 2024-09-18 DIAGNOSIS — R29.898 DECREASED GRIP STRENGTH OF RIGHT HAND: ICD-10-CM

## 2024-09-18 PROCEDURE — 97022 WHIRLPOOL THERAPY: CPT | Mod: GO | Performed by: OCCUPATIONAL THERAPIST

## 2024-09-18 PROCEDURE — 97140 MANUAL THERAPY 1/> REGIONS: CPT | Mod: GO | Performed by: OCCUPATIONAL THERAPIST

## 2024-09-18 NOTE — PROGRESS NOTES
OCCUPATIONAL THERAPY TREATMENT NOTE    Patient Name:  Colin Kay   Patient MRN: [id number]  Date: 2024  Time Calculation  Start Time: 0430  Stop Time: 0515  Time Calculation (min): 45 min    Insurance:  Visit number: 14  Insurance Type: Payor: [employer] / Plan: [employer] / Product Type: *No Product type* /   Authorization or Plan of Care date Range: 30 PT/OT/ST   Copay: NA  Referred by: Wesley Saba PA-C       OT Therapeutic Procedures Time Entry  Manual Therapy Time Entry: 30  OT Modalities Time Entry  Whirlpool Time Entry: 15                  General:  Reason for visit: Right wrist fracture  Referred by: Wesley Saba PA-C    Assessment:  Progress towards functional goals: Improved mobility in left wrist  Response to interventions:  Colin increasing DRUJ mobility with OT manual techniques.  Slow and steady improvements in forearm pronation and supination actively.   Justification for continued skilled care: To address remaining functional, objective and subjective deficits to allow them to return to full independence with ADLs. Assess effectiveness of HEP. Modify and progress home exercise program. Skilled intervention required to improve ROM which will improve function. Progressive strengthening to stabilize the right wrist to improve function. Reduce pain to improve function.    Plan:  Progress exercises as tolerated to improve overall UE strength and performance in daily activities , Therapeutic exercises to strengthen left wrist for functional use in daily activities. , Activities to improve fine motor coordination and manual dexterity skills to improve performance in ADLs and IADLs, Continued education of techniques such as heat to decrease joint stiffness and muscle tightness., Exercises to gradually increase range of motion., Manual therapy to  muscle tightness and improve joint mobility. , and Modalities as needed to address  "symptoms.      Therapy diagnoses:   1. Decreased range of motion of right wrist        2. Decreased  strength of right hand  Follow Up In Occupational Therapy              Subjective:  Patient reports her strength is improving and feels her wrist joint is loosening.     Progress towards functional goal:  Improved mobility in right wrist  Pain Location right wrist  Pain (0-10): 0; described as stiffness  HEP adherence / understanding: compliance with the instructed home exercises.    Precautions:  Fall Risk: None    Precautions listed: none    Objective:  Measurements 7/30/24, 8/14/24, 8/29/24, 9/10/24. 9/18/24  Supination 60  deg  Pronation 85 degs    Treatment Performed: (\"NP\" = Not Performed)     Therapeutic Exercise: NP    Therapeutic Activities: NP    Manual Therapy: 30 min  - aggressive joint mobilizations, joint traction and stretches to digits, wrist and forearm  Neuromuscular Re-Education: NP  -   Modalities: 15 min  - fluidotherapy with AROM 15 min    Education: Reviewed home exercise program.   "

## 2024-09-19 NOTE — PROGRESS NOTES
OCCUPATIONAL THERAPY TREATMENT NOTE    Patient Name:  Colin Kay   Patient MRN: [id number]  Date: 2024  Time Calculation  Start Time: 1145  Stop Time: 1228  Time Calculation (min): 43 min    Insurance:  Visit number: 15  Insurance Type: Payor: [employer] / Plan: [employer] / Product Type: *No Product type* /   Authorization or Plan of Care date Range: 30 PT/OT/ST   Copay: NA  Referred by: Wesley Saba PA-C       OT Therapeutic Procedures Time Entry  Manual Therapy Time Entry: 15  Therapeutic Activity Time Entry: 12  OT Modalities Time Entry  Whirlpool Time Entry: 15                  General:  Reason for visit: Right wrist fracture  Referred by: Wesley Saba PA-C    Assessment:  Progress towards functional goals: Improved mobility in left wrist  Response to interventions:  Colin demonstrated difficulty with resisted supination activity this date as compensatory movement into IR was noted to complete task.  Patient is improving strength on BTE program.   Justification for continued skilled care: To address remaining functional, objective and subjective deficits to allow them to return to full independence with ADLs. Assess effectiveness of HEP. Modify and progress home exercise program. Skilled intervention required to improve ROM which will improve function. Progressive strengthening to stabilize the right wrist to improve function. Reduce pain to improve function.    Plan:  Progress exercises as tolerated to improve overall UE strength and performance in daily activities , Therapeutic exercises to strengthen left wrist for functional use in daily activities. , Activities to improve fine motor coordination and manual dexterity skills to improve performance in ADLs and IADLs, Continued education of techniques such as heat to decrease joint stiffness and muscle tightness., Exercises to gradually increase range of motion., Manual therapy to   "muscle tightness and improve joint mobility. , and Modalities as needed to address symptoms.      Therapy diagnoses:   1. Decreased range of motion of right wrist        2. Decreased  strength of right hand  Follow Up In Occupational Therapy          Subjective:  Patient reports no pain today.  She is having more crepitus in her wrist with daily activities.     Progress towards functional goal:  Improved mobility in right wrist  Pain Location right wrist  Pain (0-10): 0; described as stiffness  HEP adherence / understanding: compliance with the instructed home exercises.    Precautions:  Fall Risk: None    Precautions listed: none    Objective:  Measurements 7/30/24, 8/14/24, 8/29/24, 9/10/24. 9/18/24  Supination 60  deg  Pronation 85 degs    Treatment Performed: (\"NP\" = Not Performed)     Therapeutic Exercise: NP    Therapeutic Activities:12 min  Resisted supination with hand helper 15#   2 min   2 min   2 min  Manual Therapy: 15 min  - aggressive joint mobilizations, joint traction and stretches to digits, wrist and forearm  Neuromuscular Re-Education: NP  -   Modalities: 15 min  - fluidotherapy with AROM 15 min    Education: Reviewed home exercise program.   "

## 2024-09-20 ENCOUNTER — TREATMENT (OUTPATIENT)
Dept: OCCUPATIONAL THERAPY | Facility: CLINIC | Age: 29
End: 2024-09-20
Payer: COMMERCIAL

## 2024-09-20 DIAGNOSIS — R29.898 DECREASED GRIP STRENGTH OF RIGHT HAND: ICD-10-CM

## 2024-09-20 DIAGNOSIS — M25.631 DECREASED RANGE OF MOTION OF RIGHT WRIST: Primary | ICD-10-CM

## 2024-09-20 PROCEDURE — 97140 MANUAL THERAPY 1/> REGIONS: CPT | Mod: GO | Performed by: OCCUPATIONAL THERAPIST

## 2024-09-20 PROCEDURE — 97022 WHIRLPOOL THERAPY: CPT | Mod: GO | Performed by: OCCUPATIONAL THERAPIST

## 2024-09-20 PROCEDURE — 97530 THERAPEUTIC ACTIVITIES: CPT | Mod: GO | Performed by: OCCUPATIONAL THERAPIST

## 2024-09-24 NOTE — PROGRESS NOTES
OCCUPATIONAL THERAPY TREATMENT NOTE    Patient Name:  Colin Kay   Patient MRN: [id number]  Date: 9/25/2024  Time Calculation  Start Time: 1145  Stop Time: 1230  Time Calculation (min): 45 min    Insurance:  Visit number: 16  Insurance Type: Payor: [employer] / Plan: [employer] / Product Type: *No Product type* /   Authorization or Plan of Care date Range: 30 PT/OT/ST   Copay: NA  Referred by: Wesley Saba PA-C       OT Therapeutic Procedures Time Entry  Manual Therapy Time Entry: 15  Therapeutic Activity Time Entry: 15  OT Modalities Time Entry  Whirlpool Time Entry: 15                  General:  Reason for visit: Right wrist fracture  Referred by: Wesley Saba PA-C    Assessment:  Progress towards functional goals: Improved mobility in left wrist  Response to interventions:  Colin was challenged with resisted pinch and supination activity with shoulder compensation demonstrated to achieve full supination.  She is improving tolerance to BTE program with increased power and distance on each tool.    Justification for continued skilled care: To address remaining functional, objective and subjective deficits to allow them to return to full independence with ADLs. Assess effectiveness of HEP. Modify and progress home exercise program. Skilled intervention required to improve ROM which will improve function. Progressive strengthening to stabilize the right wrist to improve function. Reduce pain to improve function.    Plan:  Progress exercises as tolerated to improve overall UE strength and performance in daily activities , Therapeutic exercises to strengthen left wrist for functional use in daily activities. , Activities to improve fine motor coordination and manual dexterity skills to improve performance in ADLs and IADLs, Continued education of techniques such as heat to decrease joint stiffness and muscle tightness., Exercises to gradually increase  "range of motion., Manual therapy to  muscle tightness and improve joint mobility. , and Modalities as needed to address symptoms.      Therapy diagnoses:   1. Decreased range of motion of right wrist        2. Decreased  strength of right hand  Follow Up In Occupational Therapy            Subjective:  Patient reports she needs to perform wall pushups more to increase her wrist extension.     Progress towards functional goal:  Improved mobility in right wrist  Pain Location right wrist  Pain (0-10): 0; described as stiffness  HEP adherence / understanding: compliance with the instructed home exercises.    Precautions:  Fall Risk: None    Precautions listed: none    Objective:  Measurements 24, 24, 24, 9/10/24. 24  Supination 60  deg  Pronation 85 degs    Treatment Performed: (\"NP\" = Not Performed)     Therapeutic Exercise: NP    Therapeutic Activities:15 min  Resisted pinch, reachand supination with clothespins 1#-4#)   2 min   2 min   2 min   2 min   2 min  Manual Therapy: 15 min  - aggressive joint mobilizations, joint traction and stretches to digits, wrist and forearm  Neuromuscular Re-Education: NP  -   Modalities: 15 min  - fluidotherapy with AROM 15 min    Education: Reviewed home exercise program.   "

## 2024-09-25 ENCOUNTER — TREATMENT (OUTPATIENT)
Dept: OCCUPATIONAL THERAPY | Facility: CLINIC | Age: 29
End: 2024-09-25
Payer: COMMERCIAL

## 2024-09-25 DIAGNOSIS — R29.898 DECREASED GRIP STRENGTH OF RIGHT HAND: ICD-10-CM

## 2024-09-25 DIAGNOSIS — M25.631 DECREASED RANGE OF MOTION OF RIGHT WRIST: Primary | ICD-10-CM

## 2024-09-25 PROCEDURE — 97140 MANUAL THERAPY 1/> REGIONS: CPT | Mod: GO | Performed by: OCCUPATIONAL THERAPIST

## 2024-09-25 PROCEDURE — 97022 WHIRLPOOL THERAPY: CPT | Mod: GO | Performed by: OCCUPATIONAL THERAPIST

## 2024-09-25 PROCEDURE — 97530 THERAPEUTIC ACTIVITIES: CPT | Mod: GO | Performed by: OCCUPATIONAL THERAPIST

## 2024-10-02 ENCOUNTER — APPOINTMENT (OUTPATIENT)
Dept: OCCUPATIONAL THERAPY | Facility: CLINIC | Age: 29
End: 2024-10-02
Payer: COMMERCIAL

## 2024-10-04 ENCOUNTER — TREATMENT (OUTPATIENT)
Dept: OCCUPATIONAL THERAPY | Facility: CLINIC | Age: 29
End: 2024-10-04
Payer: COMMERCIAL

## 2024-10-04 DIAGNOSIS — R29.898 DECREASED GRIP STRENGTH OF RIGHT HAND: ICD-10-CM

## 2024-10-04 PROCEDURE — 97530 THERAPEUTIC ACTIVITIES: CPT | Mod: GO | Performed by: OCCUPATIONAL THERAPIST

## 2024-10-04 PROCEDURE — 97022 WHIRLPOOL THERAPY: CPT | Mod: GO | Performed by: OCCUPATIONAL THERAPIST

## 2024-10-04 PROCEDURE — 97140 MANUAL THERAPY 1/> REGIONS: CPT | Mod: GO | Performed by: OCCUPATIONAL THERAPIST

## 2024-10-04 NOTE — PROGRESS NOTES
OCCUPATIONAL THERAPY TREATMENT NOTE    Patient Name:  Colin Kay   Patient MRN: [id number]  Date: 10/4/2024  Time Calculation  Start Time: 1145  Stop Time: 1230  Time Calculation (min): 45 min    Insurance:  Visit number: 18  Insurance Type: Payor: [employer] / Plan: [employer] / Product Type: *No Product type* /   Authorization or Plan of Care date Range: 30 PT/OT/ST   Copay: NA  Referred by: Wesley Saba PA-C       OT Therapeutic Procedures Time Entry  Manual Therapy Time Entry: 20  Therapeutic Activity Time Entry: 10  OT Modalities Time Entry  Whirlpool Time Entry: 15                  General:  Reason for visit: Right wrist fracture  Referred by: Wesley Saba PA-C    Assessment:  Progress towards functional goals: Improved mobility in left wrist  Response to interventions:  No change in active supination measurement in last 2 objective measures.  Patient stated she will increase compliance with HEP.  OT will re-measure in 2 weeks when patient returns from OOT.    Justification for continued skilled care: To address remaining functional, objective and subjective deficits to allow them to return to full independence with ADLs. Assess effectiveness of HEP. Modify and progress home exercise program. Skilled intervention required to improve ROM which will improve function. Progressive strengthening to stabilize the right wrist to improve function. Reduce pain to improve function.    Plan:  Progress exercises as tolerated to improve overall UE strength and performance in daily activities , Therapeutic exercises to strengthen left wrist for functional use in daily activities. , Activities to improve fine motor coordination and manual dexterity skills to improve performance in ADLs and IADLs, Continued education of techniques such as heat to decrease joint stiffness and muscle tightness., Exercises to gradually increase range of motion., Manual therapy to  " muscle tightness and improve joint mobility. , and Modalities as needed to address symptoms.      Therapy diagnoses:   1. Decreased  strength of right hand  Follow Up In Occupational Therapy              Subjective:  Patient reports she is concerned with her progress in supination.  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     Progress towards functional goal:  Improved mobility in right wrist  Pain Location right wrist  Pain (0-10): 0; described as stiffness  HEP adherence / understanding: compliance with the instructed home exercises.    Precautions:  Fall Risk: None    Precautions listed: none    Objective:  Measurements 24, 24, 24, 9/10/24. 24, 10/3/24  Supination 60  deg  Pronation 88 degs  flexion 60  Extension 68    Treatment Performed: (\"NP\" = Not Performed)     Therapeutic Exercise: NP    Therapeutic Activities:10 min   2 min   2 min   2 min   2 min    Manual Therapy: 15 min  - aggressive joint mobilizations, joint traction and stretches to digits, wrist and forearm  Neuromuscular Re-Education: NP  -   Modalities: 15 min  - fluidotherapy with AROM 15 min    Education:  Patient and OT reviewed HEP specific to supination AROM and passive stretches.   "

## 2024-10-21 ENCOUNTER — DOCUMENTATION (OUTPATIENT)
Dept: OCCUPATIONAL THERAPY | Facility: CLINIC | Age: 29
End: 2024-10-21
Payer: COMMERCIAL

## 2024-10-21 ENCOUNTER — APPOINTMENT (OUTPATIENT)
Dept: OCCUPATIONAL THERAPY | Facility: CLINIC | Age: 29
End: 2024-10-21
Payer: COMMERCIAL

## 2024-10-21 DIAGNOSIS — R29.898 DECREASED GRIP STRENGTH OF RIGHT HAND: ICD-10-CM

## 2024-10-21 NOTE — PROGRESS NOTES
Occupational Therapy                 Therapy Communication Note    Patient Name: Colin Kay  MRN: [id number]  Department:   Room: Room/bed info not found  Today's Date: 10/21/2024     Discipline: Occupational Therapy    Missed Visit Reason: Patient cancelled via automated system     Missed Time: Cancel    Comment: